# Patient Record
(demographics unavailable — no encounter records)

---

## 2018-10-30 NOTE — EDPHY
H & P


Time Seen by Provider: 10/30/18 13:32


HPI/ROS: 





CHIEF COMPLAINT:  Back pain





HISTORY OF PRESENT ILLNESS:  Arrives by EMS.  Patient just moved here from 

[place] and [place] [address] said he injured his back and"tweaked it.

"  Not associated with weakness or numbness in extremities or loss of 

consciousness.


Patient said he lost his balance, no loss of consciousness.  He did fall very 

far and landed on his buttocks and his elbow and his back.  No other injury.  

Not associated with laceration.  Pain is low back and does not radiate.





REVIEW OF SYSTEMS:


Eye: no change in vision


ENT: no sore throat


Cardiac:  No syncope or chest pain


Pulmonary:  Not short of breath


Abdomen:  No vomiting or abdominal pain


Musculoskeletal:  HPI


Skin: no rash


Neuro: no headache


Constitutional: no fever


:  No incontinence





A comprehensive 10 point review of systems is otherwise negative aside from 

elements mentioned in the history of present illness.





PAST MEDICAL HISTORY:  Previous back pain but never had surgery





Social history:  Tobacco smoker, just moved from [place], no local PCP





General Appearance: Alert and conversant, cooperative.


Eyes: No scleral  icterus. 


ENT, Mouth: Normal mucous membranes.


Respiratory: Normal respiratory effort, breath sounds equal, lungs are clear to 

auscultation.


Cardiovascular:  Regular rate and rhythm.


Gastrointestinal:  Abdomen is soft and non tender.


Neurological: Alert, face symmetric, normal motor and sensory in extremities.   

No clonus, toes downgoing bilaterally, patellar reflexes 2+ and symmetric.  

Straight leg raising negative bilaterally.


Skin: Warm and dry, no rashes.


Musculoskeletal:  Paraspinal lower lumbar tenderness but no midline cervical 

thoracic or lumbar spine tenderness.  No CVA tenderness.


Psychiatric: Not agitated.





Emergency Department course/MDM:





PDMP search performed.  No records in database, but does not include the [facility].  Normal neurologic examination.


1 Percocet, 600 ibuprofen, lumbar spine x-rays.





1428:  Results discussed, additional Percocet and lidocaine patch.


1628:  Seen by case management, ambulatory, comfortable with discharge with 

some oral pain medication.


Smoking Status: Current every day smoker


Constitutional: 


 Initial Vital Signs











Temperature (C)  36.6 C   10/30/18 13:34


 


Heart Rate  73   10/30/18 13:34


 


Respiratory Rate  18   10/30/18 13:34


 


Blood Pressure  128/78 H  10/30/18 13:34


 


O2 Sat (%)  94   10/30/18 13:34








 











O2 Delivery Mode               Room Air














Allergies/Adverse Reactions: 


 





No Known Allergies Allergy (Unverified 10/30/18 13:32)


 








Home Medications: 














 Medication  Instructions  Recorded


 


NK [No Known Home Meds]  10/30/18














Medical Decision Making





- Diagnostics


Imaging Results: 


 Imaging Impressions





Lumbar Spine X-Ray  10/30/18 13:42


Impression:


1. No acute fractures seen about the lumbar spine.


2. Marginal osteophytes at L2-L3 and L3-L4 without significant disk space 

narrowing..











Imaging: I viewed and interpreted images myself


Differential Diagnosis: 





Differential considered including but not limited to lumbar spine fracture, 

muscle strain, spinous process fracture, herniated disc, kidney injury





- Data Points


Medications Given: 


 








Discontinued Medications





Ibuprofen (Motrin)  600 mg PO EDNOW ONE


   Stop: 10/30/18 13:43


   Last Admin: 10/30/18 13:48 Dose:  600 mg


Miscellaneous Information (Patch Removal)  1 ea TD DAILY21 MURALI


   Stop: 04/28/19 20:59


   Last Admin: 10/30/18 14:37 Dose:  Not Given


Miscellaneous Medication (Icy Hot Lidocaine/Menthol 4%/1% Patch)  2 patch TD 

EDNOW ONE


   Stop: 10/30/18 14:29


   Last Admin: 10/30/18 14:37 Dose:  2 patch


Oxycodone/Acetaminophen (Percocet 5/325)  1 tab PO EDNOW ONE


   Stop: 10/30/18 13:43


   Last Admin: 10/30/18 13:48 Dose:  1 tab


Oxycodone/Acetaminophen (Percocet 5/325)  1 tab PO EDNOW ONE


   Stop: 10/30/18 14:29


   Last Admin: 10/30/18 14:36 Dose:  1 tab


Oxycodone/Acetaminophen (Percocet 5/325mg Prepack#4)  1 btl TAKEHOME EDNOW ONE


   Stop: 10/30/18 16:20


   Last Admin: 10/30/18 16:28 Dose:  1 btl








Departure





- Departure


Disposition: Home, Routine, Self-Care


Clinical Impression: 


Low back strain


Qualifiers:


 Encounter type: initial encounter Qualified Code(s): S39.012A - Strain of 

muscle, fascia and tendon of lower back, initial encounter





Condition: Good


Instructions:  Oxycodone/Acetaminophen (By mouth), Acute Low Back Pain (ED)


Referrals: 


[facility],. [Clinic] - As per Instructions


Patient,NotPresent [Unknown] - As per Instructions


(Dr. Feliz Huerta PCP on call


[phone])

## 2018-11-03 NOTE — EDPHY
H & P


Time Seen by Provider: 11/03/18 07:32


HPI/ROS: 





HPI


Chest pain after methamphetamine abuse.





51-year-old male.  He is homeless.  He presents to the emergency department by 

ambulance.  He reports that early this morning he tried some methamphetamine 

which he snorted.  He states it was very strong.  He reports that after doing 

this he developed chest pain which he described as sharp and stabbing in the 

mid anterior chest.  He also has a history of alcohol abuse.  He drinks at 

least of 5th of whiskey a day.  Last drink was within the last hour.  He denies 

other drugs of abuse.  He states that he does not have any history of coronary 

artery disease.  He is a smoker.  Denies other risk factors other than 

occasional methamphetamine and cocaine abuse.  No associated shortness of 

breath.  He states that his chest pain is now gone.  He reports onset of chest 

pain was several hours ago.





ROS:





Constitutional:  No fever, no chills.  He is feeling anxious.


Eyes:  No discharge.  No changes in vision.


ENT:  No sore throat.  No nasal congestion or rhinorrhea.


Respiratory:  No cough.  No shortness of breath.


Cardiac:  As above, no palpitations.


Gastrointestinal:  No abdominal pain, no vomiting, no diarrhea.


Genitourinary:  No hematuria.  No dysuria or increased frequency with urination.


Musculoskeletal:  No back pain.  No neck pain.  No myalgias or arthralgias.


Skin:  No rashes.


Neurological:  No headache.  No focal weakness or altered sensation.





Past medical history:  He currently does not have a primary care physician.  He 

denies any significant past medical history.





Social history:  Alcohol abuse.  Homeless.  Smoker.  Street drug abuse.





Physical Exam:





General Appearance:  Alert, he is not in distress.  Dirty and disheveled.  This 

patient is responding to questions appropriately and in full sentences.  This 

patient appears well-hydrated and well-nourished.


Eyes:  Pupils equal and round no pallor or injection.  No lid edema, erythema 

or injection.


Respiratory:  There are no retractions, lungs are clear to auscultation 

anteriorly with good air movement bilaterally.


Cardiovascular:  Regular rate and rhythm.  Tachycardia.  No murmur appreciated.


Gastrointestinal:  Abdomen is soft and nontender, no masses, bowel sounds 

normal.  No focal tenderness at McBurney's point.  No Lee sign.


Neurological:  Motor sensory function is grossly intact.  Cranial nerves are 

normal.  Gait is normal.


Skin:  Warm and dry, no rashes.


Musculoskeletal:  Neck is supple and nontender.


Extremities are symmetrical.  All joints range without pain or impingement.


Psychiatric:  No agitation.  No depression.





Database:





EKG:





EKG time is 7:41 a.m.; EKG shows a narrow complex normal sinus tachycardia with 

a ventricular rate of 130. Probable left ventricular hypertrophy.  The RI, QRS, 

QT intervals are within normal limits.  There are no ST-T wave changes 

indicative of ischemic or injury pattern.  No evidence of right heart strain.  

Interpreted by me.





Imaging:





Chest x-ray; the cardiac mediastinal silhouette is unremarkable.  No evidence 

of infiltrate or pneumothorax.  No acute cardiopulmonary disease process noted.

  Interpreted by me.





Procedures:





Emergency department course:





Cardiac monitor shows a narrow complex sinus tachycardia with ventricular rate 

of 132. Blood pressure is 140/116.  Pulse oximetry 95%.  The patient is 

afebrile.  IV was established by EMS.  The patient has been given 324 mg of 

chewed aspirin.  As of 7:40 a.m., the patient denies any chest pain or 

shortness of breath at this time.  EKG obtained and reviewed by myself.  The 

patient will be started on IV normal saline with 500 cc to 1 L to be given over 

the next hour.  He will also be given 5 mg of IV Valium.  This will be repeated 

as needed for symptoms associated with sympathomimetic toxicity.





8:20 a.m., the patient is currently resting comfortably.  He is still 

tachycardic at 1:30 a.m..  He refused having a chest x-ray.  He declines 

further dosing of Valium.  He is asking for discharge at this time.  I asked if 

we could observe him for a while longer.  He consents.





9:00 a.m., the patient is requesting discharge.  I talked to him about staying 

for a repeat troponin.  He does not want to do this.  In my professional 

opinion he understands the risks of withdrawing from further emergency 

department observation and testing.  I discussed follow-up with the patient.  

The liability of methamphetamine and cocaine abuse was discussed with the 

patient.  Return to emergency department precautions thoroughly reviewed.  All 

of his questions were answered.  He was discharged against medical advice at 9:

00 a.m..





Differential Diagnosis:





The differential diagnosis on this patient includes but is not limited to 

methamphetamine induced chest pain, tachycardia secondary to methamphetamine 

abuse.  Pulmonary embolism, acute coronary syndrome, pneumothorax unlikely.  

This represents a partial list of diagnoses considered.  These considerations 

are based on history, physical exam, past history, reassessment and diagnostic 

testing.


Smoking Status: Current every day smoker


Constitutional: 


 Initial Vital Signs











Temperature (C)  37.0 C   11/03/18 07:48


 


Heart Rate  140 H  11/03/18 07:48


 


Respiratory Rate  16   11/03/18 07:48


 


Blood Pressure  143/97 H  11/03/18 07:48


 


O2 Sat (%)  97   11/03/18 07:48








 











O2 Delivery Mode               Room Air














Allergies/Adverse Reactions: 


 





codeine Allergy (Verified 11/03/18 07:48)


 








Home Medications: 














 Medication  Instructions  Recorded


 


NK [No Known Home Meds]  10/30/18














Medical Decision Making





- Data Points


Laboratory Results: 


 Laboratory Results





 11/03/18 07:35 





 11/03/18 07:35 





 











  11/03/18 11/03/18 11/03/18





  07:45 07:35 07:35


 


WBC      15.53 10^3/uL H 10^3/uL





     (3.80-9.50) 


 


RBC      5.56 10^6/uL 10^6/uL





     (4.40-6.38) 


 


Hgb      19.0 g/dL H g/dL





     (13.7-17.5) 


 


Hct      54.4 % H %





     (40.0-51.0) 


 


MCV      97.8 fL fL





     (81.5-99.8) 


 


MCH      34.2 pg H pg





     (27.9-34.1) 


 


MCHC      34.9 g/dL g/dL





     (32.4-36.7) 


 


RDW      12.7 % %





     (11.5-15.2) 


 


Plt Count      286 10^3/uL 10^3/uL





     (150-400) 


 


MPV      9.4 fL fL





     (8.7-11.7) 


 


Neut % (Auto)      84.6 % H %





     (39.3-74.2) 


 


Lymph % (Auto)      6.1 % L %





     (15.0-45.0) 


 


Mono % (Auto)      8.4 % %





     (4.5-13.0) 


 


Eos % (Auto)      0.1 % L %





     (0.6-7.6) 


 


Baso % (Auto)      0.3 % %





     (0.3-1.7) 


 


Nucleat RBC Rel Count      0.0 % %





     (0.0-0.2) 


 


Absolute Neuts (auto)      13.16 10^3/uL H 10^3/uL





     (1.70-6.50) 


 


Absolute Lymphs (auto)      0.94 10^3/uL L 10^3/uL





     (1.00-3.00) 


 


Absolute Monos (auto)      1.30 10^3/uL H 10^3/uL





     (0.30-0.80) 


 


Absolute Eos (auto)      0.01 10^3/uL L 10^3/uL





     (0.03-0.40) 


 


Absolute Basos (auto)      0.04 10^3/uL 10^3/uL





     (0.02-0.10) 


 


Absolute Nucleated RBC      0.00 10^3/uL 10^3/uL





     (0-0.01) 


 


Immature Gran %      0.5 % %





     (0.0-1.1) 


 


Immature Gran #      0.08 10^3/uL 10^3/uL





     (0.00-0.10) 


 


Sodium    142 mEq/L mEq/L  





    (135-145)  


 


Potassium    4.4 mEq/L mEq/L  





    (3.3-5.0)  


 


Chloride    102 mEq/L mEq/L  





    ()  


 


Carbon Dioxide    27 mEq/l mEq/l  





    (22-31)  


 


Anion Gap    13 mEq/L mEq/L  





    (6-14)  


 


BUN    12 mg/dL mg/dL  





    (7-23)  


 


Creatinine    1.4 mg/dL H mg/dL  





    (0.7-1.3)  


 


Estimated GFR    53   





    


 


Glucose    78 mg/dL mg/dL  





    ()  


 


Calcium    10.6 mg/dL H mg/dL  





    (8.5-10.4)  


 


POC Troponin I  0.03 ng/mL ng/mL    





   (0.00-0.08)   


 


Ethyl Alcohol    < 10 mg/dL mg/dL  





    (0-10)  











Medications Given: 


 








Discontinued Medications





Aspirin (Aspirin)  324 mg PO EDNOW ONE


   Stop: 11/03/18 07:33


   Last Admin: 11/03/18 07:55 Dose:  Not Given


Diazepam (Valium)  5 mg IVP EDNOW ONE


   Stop: 11/03/18 07:38


   Last Admin: 11/03/18 07:54 Dose:  5 mg


Sodium Chloride (Ns)  500 mls @ 1,000 mls/hr IV EDNOW ONE


   PRN Reason: Protocol


   Stop: 11/03/18 08:01


   Last Admin: 11/03/18 07:55 Dose:  500 mls





Point of Care Test Results: 


 Chemistry











  11/03/18





  07:45


 


POC Troponin I  0.03 ng/mL ng/mL





   (0.00-0.08) 














Departure





- Departure


Disposition: Against Medical Advice


Clinical Impression: 


 Chest pain, Methamphetamine abuse, Alcohol abuse





Condition: Good


Instructions:  Chest Pain (ED), Methamphetamine Abuse (ED)


Additional Instructions: 


Read and follow provided instructions.





Follow-up at [facility] at their walk-in clinic on [facility] on 

Monday for re-evaluation.  You need to keep yourself hydrated and drink lots of 

fluids.  Not alcohol.  Your kidney function should be rechecked at [facility] as well next week.  They will have access to your records here.





Do not use methamphetamine or other street drugs.





Return to the emergency department for worsening chest pain, shortness of 

breath or other serious concerns.


Referrals: 


[facility] [Outside] - As per Instructions

## 2018-11-03 NOTE — CPEKG
Test Reason : OPEN

Blood Pressure : ***/*** mmHG

Vent. Rate : 130 BPM     Atrial Rate : 130 BPM

   P-R Int : 147 ms          QRS Dur : 091 ms

    QT Int : 299 ms       P-R-T Axes : 077 -74 084 degrees

   QTc Int : 440 ms

 

Sinus tachycardia

LAE, consider biatrial enlargement

Left anterior fascicular block

Probable left ventricular hypertrophy

ST elevation suggests acute pericarditis

 

Confirmed by McCollester, Laughlin ([id number]) on 11/3/2018 3:29:06 PM

 

Referred By:             Confirmed By:Laughlin McCollester